# Patient Record
Sex: FEMALE | Race: BLACK OR AFRICAN AMERICAN | URBAN - METROPOLITAN AREA
[De-identification: names, ages, dates, MRNs, and addresses within clinical notes are randomized per-mention and may not be internally consistent; named-entity substitution may affect disease eponyms.]

---

## 2024-01-02 ENCOUNTER — ANCILLARY PROCEDURE (OUTPATIENT)
Dept: ULTRASOUND IMAGING | Facility: CLINIC | Age: 29
End: 2024-01-02
Attending: ADVANCED PRACTICE MIDWIFE
Payer: COMMERCIAL

## 2024-01-02 DIAGNOSIS — N92.1 MENOMETRORRHAGIA: ICD-10-CM

## 2024-01-02 DIAGNOSIS — M54.50 ACUTE BILATERAL LOW BACK PAIN WITHOUT SCIATICA: ICD-10-CM

## 2024-01-02 DIAGNOSIS — R10.2 PELVIC PAIN: ICD-10-CM

## 2024-01-02 PROCEDURE — 76830 TRANSVAGINAL US NON-OB: CPT | Performed by: RADIOLOGY

## 2024-01-02 PROCEDURE — 76856 US EXAM PELVIC COMPLETE: CPT | Performed by: RADIOLOGY

## 2024-09-11 ENCOUNTER — ANCILLARY PROCEDURE (OUTPATIENT)
Dept: ULTRASOUND IMAGING | Facility: CLINIC | Age: 29
End: 2024-09-11
Attending: ADVANCED PRACTICE MIDWIFE

## 2024-09-11 DIAGNOSIS — N92.1 MENOMETRORRHAGIA: ICD-10-CM

## 2024-09-11 DIAGNOSIS — N80.03 ADENOMYOSIS: ICD-10-CM

## 2024-09-11 DIAGNOSIS — E28.2 PCOS (POLYCYSTIC OVARIAN SYNDROME): ICD-10-CM

## 2024-09-11 PROCEDURE — 76830 TRANSVAGINAL US NON-OB: CPT | Mod: GC | Performed by: RADIOLOGY

## 2024-09-11 PROCEDURE — 76856 US EXAM PELVIC COMPLETE: CPT | Mod: GC | Performed by: RADIOLOGY

## 2024-10-29 ENCOUNTER — OFFICE VISIT (OUTPATIENT)
Dept: MIDWIFE SERVICES | Facility: CLINIC | Age: 29
End: 2024-10-29
Payer: COMMERCIAL

## 2024-10-29 VITALS
OXYGEN SATURATION: 100 % | DIASTOLIC BLOOD PRESSURE: 89 MMHG | WEIGHT: 149.5 LBS | HEART RATE: 83 BPM | SYSTOLIC BLOOD PRESSURE: 138 MMHG

## 2024-10-29 DIAGNOSIS — R53.83 FATIGUE, UNSPECIFIED TYPE: ICD-10-CM

## 2024-10-29 DIAGNOSIS — K59.00 CONSTIPATION, UNSPECIFIED CONSTIPATION TYPE: ICD-10-CM

## 2024-10-29 DIAGNOSIS — N94.6 DYSMENORRHEA: Primary | ICD-10-CM

## 2024-10-29 LAB
ERYTHROCYTE [DISTWIDTH] IN BLOOD BY AUTOMATED COUNT: 14.5 % (ref 10–15)
HCT VFR BLD AUTO: 38.4 % (ref 35–47)
HGB BLD-MCNC: 12.1 G/DL (ref 11.7–15.7)
MCH RBC QN AUTO: 25.4 PG (ref 26.5–33)
MCHC RBC AUTO-ENTMCNC: 31.5 G/DL (ref 31.5–36.5)
MCV RBC AUTO: 81 FL (ref 78–100)
PLATELET # BLD AUTO: 268 10E3/UL (ref 150–450)
RBC # BLD AUTO: 4.76 10E6/UL (ref 3.8–5.2)
WBC # BLD AUTO: 6.9 10E3/UL (ref 4–11)

## 2024-10-29 PROCEDURE — 99203 OFFICE O/P NEW LOW 30 MIN: CPT | Performed by: ADVANCED PRACTICE MIDWIFE

## 2024-10-29 PROCEDURE — 82306 VITAMIN D 25 HYDROXY: CPT | Performed by: ADVANCED PRACTICE MIDWIFE

## 2024-10-29 PROCEDURE — 85027 COMPLETE CBC AUTOMATED: CPT | Performed by: ADVANCED PRACTICE MIDWIFE

## 2024-10-29 PROCEDURE — 36415 COLL VENOUS BLD VENIPUNCTURE: CPT | Performed by: ADVANCED PRACTICE MIDWIFE

## 2024-10-29 RX ORDER — METFORMIN HYDROCHLORIDE 500 MG/1
500 TABLET, EXTENDED RELEASE ORAL
COMMUNITY
Start: 2023-08-25

## 2024-10-29 NOTE — PROGRESS NOTES
S:  Omar reports heavy painful periods, sometimes irregular. She has been diagnosed with PCOS and Adenmyosis.  She says she has tried hormonal birth control and it did not help.  She is not sexually active.  She also asks for blood tests because she is feeling tired.  She also says she struggles with constipation.      O:  Appears well  A/P   (N94.6) Dysmenorrhea  (primary encounter diagnosis)  Plan:  Discussed hormonal birth control to control bleeding and symptoms.  Explained how it works.  She declines.  Please see OB/Gyn MD.    Reviewed previous notes from previous providers and 2 past pelvic USs.      (R53.83) Fatigue, unspecified type  Plan: Vitamin D Deficiency, CBC with platelets        Labs ordered per her request   We discussed daily vitamin with iron and vitamin D    (K59.00) Constipation, unspecified constipation type  Plan: Discussed fiber, water and OTC medications available and how they work

## 2024-10-30 LAB — VIT D+METAB SERPL-MCNC: 24 NG/ML (ref 20–50)

## 2025-07-30 ENCOUNTER — OFFICE VISIT (OUTPATIENT)
Dept: OBGYN | Facility: CLINIC | Age: 30
End: 2025-07-30
Payer: MEDICAID

## 2025-07-30 VITALS — SYSTOLIC BLOOD PRESSURE: 157 MMHG | HEART RATE: 78 BPM | WEIGHT: 154.6 LBS | DIASTOLIC BLOOD PRESSURE: 98 MMHG

## 2025-07-30 DIAGNOSIS — R39.15 URINARY URGENCY: ICD-10-CM

## 2025-07-30 DIAGNOSIS — E28.2 PCOS (POLYCYSTIC OVARIAN SYNDROME): Primary | ICD-10-CM

## 2025-07-30 DIAGNOSIS — Z30.011 ORAL CONTRACEPTIVE PRESCRIBED: ICD-10-CM

## 2025-07-30 DIAGNOSIS — Z13.21 ENCOUNTER FOR VITAMIN DEFICIENCY SCREENING: ICD-10-CM

## 2025-07-30 LAB
ALBUMIN UR-MCNC: NEGATIVE MG/DL
APPEARANCE UR: CLEAR
BACTERIA #/AREA URNS HPF: ABNORMAL /HPF
BILIRUB UR QL STRIP: NEGATIVE
COLOR UR AUTO: YELLOW
ERYTHROCYTE [DISTWIDTH] IN BLOOD BY AUTOMATED COUNT: 14.3 % (ref 10–15)
EST. AVERAGE GLUCOSE BLD GHB EST-MCNC: 123 MG/DL
GLUCOSE UR STRIP-MCNC: NEGATIVE MG/DL
HBA1C MFR BLD: 5.9 % (ref 0–5.6)
HCT VFR BLD AUTO: 39.4 % (ref 35–47)
HGB BLD-MCNC: 12.7 G/DL (ref 11.7–15.7)
HGB UR QL STRIP: ABNORMAL
HOLD SPECIMEN: NORMAL
KETONES UR STRIP-MCNC: NEGATIVE MG/DL
LEUKOCYTE ESTERASE UR QL STRIP: NEGATIVE
MCH RBC QN AUTO: 25.2 PG (ref 26.5–33)
MCHC RBC AUTO-ENTMCNC: 32.2 G/DL (ref 31.5–36.5)
MCV RBC AUTO: 78 FL (ref 78–100)
NITRATE UR QL: NEGATIVE
PH UR STRIP: 6 [PH] (ref 5–7)
PLATELET # BLD AUTO: 235 10E3/UL (ref 150–450)
RBC # BLD AUTO: 5.04 10E6/UL (ref 3.8–5.2)
RBC #/AREA URNS AUTO: ABNORMAL /HPF
SP GR UR STRIP: 1.02 (ref 1–1.03)
UROBILINOGEN UR STRIP-ACNC: 0.2 E.U./DL
WBC # BLD AUTO: 6.1 10E3/UL (ref 4–11)
WBC #/AREA URNS AUTO: ABNORMAL /HPF

## 2025-07-30 PROCEDURE — 99214 OFFICE O/P EST MOD 30 MIN: CPT | Performed by: OBSTETRICS & GYNECOLOGY

## 2025-07-30 PROCEDURE — 83735 ASSAY OF MAGNESIUM: CPT | Performed by: OBSTETRICS & GYNECOLOGY

## 2025-07-30 PROCEDURE — 82306 VITAMIN D 25 HYDROXY: CPT | Performed by: OBSTETRICS & GYNECOLOGY

## 2025-07-30 PROCEDURE — 83540 ASSAY OF IRON: CPT | Performed by: OBSTETRICS & GYNECOLOGY

## 2025-07-30 PROCEDURE — 83550 IRON BINDING TEST: CPT | Performed by: OBSTETRICS & GYNECOLOGY

## 2025-07-30 PROCEDURE — 83036 HEMOGLOBIN GLYCOSYLATED A1C: CPT | Performed by: OBSTETRICS & GYNECOLOGY

## 2025-07-30 PROCEDURE — 3080F DIAST BP >= 90 MM HG: CPT | Performed by: OBSTETRICS & GYNECOLOGY

## 2025-07-30 PROCEDURE — 85027 COMPLETE CBC AUTOMATED: CPT | Performed by: OBSTETRICS & GYNECOLOGY

## 2025-07-30 PROCEDURE — 80053 COMPREHEN METABOLIC PANEL: CPT | Performed by: OBSTETRICS & GYNECOLOGY

## 2025-07-30 PROCEDURE — 84443 ASSAY THYROID STIM HORMONE: CPT | Performed by: OBSTETRICS & GYNECOLOGY

## 2025-07-30 PROCEDURE — 3044F HG A1C LEVEL LT 7.0%: CPT | Performed by: OBSTETRICS & GYNECOLOGY

## 2025-07-30 PROCEDURE — 82607 VITAMIN B-12: CPT | Performed by: OBSTETRICS & GYNECOLOGY

## 2025-07-30 PROCEDURE — 82728 ASSAY OF FERRITIN: CPT | Performed by: OBSTETRICS & GYNECOLOGY

## 2025-07-30 PROCEDURE — 81001 URINALYSIS AUTO W/SCOPE: CPT | Performed by: OBSTETRICS & GYNECOLOGY

## 2025-07-30 PROCEDURE — 3077F SYST BP >= 140 MM HG: CPT | Performed by: OBSTETRICS & GYNECOLOGY

## 2025-07-30 PROCEDURE — 36415 COLL VENOUS BLD VENIPUNCTURE: CPT | Performed by: OBSTETRICS & GYNECOLOGY

## 2025-07-30 RX ORDER — METFORMIN HYDROCHLORIDE 500 MG/1
500 TABLET, EXTENDED RELEASE ORAL
Qty: 270 TABLET | Refills: 4 | Status: SHIPPED | OUTPATIENT
Start: 2025-07-30

## 2025-07-30 RX ORDER — DROSPIRENONE AND ETHINYL ESTRADIOL 0.02-3(28)
1 KIT ORAL DAILY
Qty: 84 TABLET | Refills: 4 | Status: SHIPPED | OUTPATIENT
Start: 2025-07-30

## 2025-07-30 RX ORDER — IBUPROFEN 800 MG/1
800 TABLET, FILM COATED ORAL EVERY 8 HOURS PRN
Qty: 30 TABLET | Refills: 0 | Status: SHIPPED | OUTPATIENT
Start: 2025-07-30

## 2025-07-30 NOTE — PROGRESS NOTES
CC: PCOS and irregular periods  HPI:  Omar Davies is a 30 year old female Patient's last menstrual period was 06/10/2025.   Has known PCOS and adenomyosis.  Has questions of what adenomyosis actually means but does have very painful menstrual cycles.  Has been prescribed birth control pills in the past but has never used them more than a month at a time so has not noticed a difference with them.  One that she tried did affect her mood and seem to cause leg pain so concerned about using this.    Was  and had 1 miscarriage but now is  and not sexually active.  Does have episodes of urinary urgency.  Feels that all of her vitamins are low as she is not eating a healthy diet and would like those checked today.  Also has been tried on metformin in the past and unsure if she is to try this again?    Allergies: Patient has no known allergies.      EXAM:  Blood pressure (!) 157/98, pulse 78, weight 70.1 kg (154 lb 9.6 oz), last menstrual period 06/10/2025.    General - pleasant female in no acute distress.  Psychiactric - appropriate mood and affect  Neurological - alert and oriented X 3    ASSESSMENT/PLAN:  prep time day of service 5 min  visit time with the patient 21 min  post visit work including documentation time 8 min  Total time: 34 min    (E28.2) PCOS (polycystic ovarian syndrome)  (primary encounter diagnosis)  Plan: metFORMIN (GLUCOPHAGE XR) 500 MG 24 hr tablet,         ibuprofen (ADVIL/MOTRIN) 800 MG tablet        Discussed taking scheduled ibuprofen with this cycle should help with pain and prescription was done.  Will do trial of metformin. Discussed how metformin works, expected side effects and possible weight loss and normal cycling that can occur with taking the medication.  She is willing to try the Metformin and script was done.      (Z13.21) Encounter for vitamin deficiency screening  Comment: Patient request  Plan: CBC with Platelets and Reflex to Iron Studies,         Vitamin D  Deficiency, Vitamin B12, Hemoglobin         A1c, TSH with free T4 reflex, Magnesium,         Comprehensive metabolic panel (BMP + Alb, Alk         Phos, ALT, AST, Total. Bili, TP)        Check labs today.  She is taking magnesium at night in the past but this gave her diarrhea.    (Z30.011) Oral contraceptive prescribed  Comment: Adenomyosis on ultrasound  Plan: drospirenone-ethinyl estradiol (DOT) 3-0.02 MG         tablet        We specifically reviewed all methods of birth control, how they work, side effects and effectiveness.  Discussed cycle control is the key and she prefers to try a birth control pill on a regular basis.  Continuous use was discussed but she would like monthly withdrawal bleeds for now.  Will let me know if this pill is not working for her or has any issues within the next 3 months.      (R39.15) Urinary urgency  Comment: Occasional  Plan: UA Macroscopic with reflex to Microscopic and         Culture - Lab Collect        Check lab today      Malinda Palmer MD

## 2025-07-30 NOTE — PATIENT INSTRUCTIONS
With next menses, take ibuprofen scheduled every 8 hours for bleeding/pain    Start birthcontrol pill 1-3 days after period starts and take pack the regular way. Let me know if any issues

## 2025-07-31 LAB
ALBUMIN SERPL BCG-MCNC: 4.4 G/DL (ref 3.5–5.2)
ALP SERPL-CCNC: 71 U/L (ref 40–150)
ALT SERPL W P-5'-P-CCNC: 8 U/L (ref 0–50)
ANION GAP SERPL CALCULATED.3IONS-SCNC: 10 MMOL/L (ref 7–15)
AST SERPL W P-5'-P-CCNC: 21 U/L (ref 0–45)
BILIRUB SERPL-MCNC: 0.8 MG/DL
BUN SERPL-MCNC: 12.5 MG/DL (ref 6–20)
CALCIUM SERPL-MCNC: 9.8 MG/DL (ref 8.8–10.4)
CHLORIDE SERPL-SCNC: 105 MMOL/L (ref 98–107)
CREAT SERPL-MCNC: 0.49 MG/DL (ref 0.51–0.95)
EGFRCR SERPLBLD CKD-EPI 2021: >90 ML/MIN/1.73M2
FERRITIN SERPL-MCNC: 44 NG/ML (ref 6–175)
GLUCOSE SERPL-MCNC: 104 MG/DL (ref 70–99)
HCO3 SERPL-SCNC: 24 MMOL/L (ref 22–29)
IRON BINDING CAPACITY (ROCHE): 246 UG/DL (ref 240–430)
IRON SATN MFR SERPL: 24 % (ref 15–46)
IRON SERPL-MCNC: 59 UG/DL (ref 37–145)
MAGNESIUM SERPL-MCNC: 2 MG/DL (ref 1.7–2.3)
POTASSIUM SERPL-SCNC: 4.1 MMOL/L (ref 3.4–5.3)
PROT SERPL-MCNC: 8.1 G/DL (ref 6.4–8.3)
SODIUM SERPL-SCNC: 139 MMOL/L (ref 135–145)
TSH SERPL DL<=0.005 MIU/L-ACNC: 1.13 UIU/ML (ref 0.3–4.2)
VIT B12 SERPL-MCNC: 592 PG/ML (ref 232–1245)
VIT D+METAB SERPL-MCNC: 25 NG/ML (ref 20–50)